# Patient Record
Sex: FEMALE | Race: WHITE | Employment: FULL TIME | URBAN - METROPOLITAN AREA
[De-identification: names, ages, dates, MRNs, and addresses within clinical notes are randomized per-mention and may not be internally consistent; named-entity substitution may affect disease eponyms.]

---

## 2021-04-19 ENCOUNTER — NURSE TRIAGE (OUTPATIENT)
Dept: FAMILY MEDICINE CLINIC | Facility: CLINIC | Age: 36
End: 2021-04-19

## 2021-04-19 NOTE — TELEPHONE ENCOUNTER
Action Requested: Summary for Provider     []  Critical Lab, Recommendations Needed  [] Need Additional Advice  []   FYI    []   Need Orders  [] Need Medications Sent to Pharmacy  []  Other     SUMMARY: Patient c/o feeling a little anxious, recently moved

## 2021-05-07 ENCOUNTER — OFFICE VISIT (OUTPATIENT)
Dept: FAMILY MEDICINE CLINIC | Facility: CLINIC | Age: 36
End: 2021-05-07
Payer: COMMERCIAL

## 2021-05-07 VITALS
HEART RATE: 78 BPM | HEIGHT: 65 IN | BODY MASS INDEX: 19.89 KG/M2 | TEMPERATURE: 98 F | WEIGHT: 119.38 LBS | SYSTOLIC BLOOD PRESSURE: 112 MMHG | DIASTOLIC BLOOD PRESSURE: 73 MMHG

## 2021-05-07 DIAGNOSIS — T78.3XXD ANGIOEDEMA, SUBSEQUENT ENCOUNTER: Primary | ICD-10-CM

## 2021-05-07 DIAGNOSIS — F32.A ANXIETY AND DEPRESSION: ICD-10-CM

## 2021-05-07 DIAGNOSIS — F41.9 ANXIETY AND DEPRESSION: ICD-10-CM

## 2021-05-07 PROCEDURE — 99203 OFFICE O/P NEW LOW 30 MIN: CPT | Performed by: FAMILY MEDICINE

## 2021-05-07 PROCEDURE — 3074F SYST BP LT 130 MM HG: CPT | Performed by: FAMILY MEDICINE

## 2021-05-07 PROCEDURE — 3008F BODY MASS INDEX DOCD: CPT | Performed by: FAMILY MEDICINE

## 2021-05-07 PROCEDURE — 3078F DIAST BP <80 MM HG: CPT | Performed by: FAMILY MEDICINE

## 2021-05-07 RX ORDER — PREDNISONE 20 MG/1
20 TABLET ORAL DAILY
COMMUNITY

## 2021-05-07 RX ORDER — DIPHENHYDRAMINE HCL 12.5MG/5ML
LIQUID (ML) ORAL 4 TIMES DAILY PRN
COMMUNITY

## 2021-05-10 NOTE — PROGRESS NOTES
HPI:    Luke Adler is a 39year old female presents to clinic as a new patient to establish care. Patient is following up on her recent ER visit. On 5/6, patient was seen in the ER with hives on her face and lip swelling.   Thinks reaction could hav encounter diagnosis)  Plan:   -Will continue prednisone as directed. Okay to take Benadryl as needed itching, discomfort.     (F41.9,  F32.9) Anxiety and depression  Plan:   -Discouraged against restarting Zoloft. Can try Lexapro or Celexa.   After patien

## 2021-06-10 ENCOUNTER — PATIENT MESSAGE (OUTPATIENT)
Dept: FAMILY MEDICINE CLINIC | Facility: CLINIC | Age: 36
End: 2021-06-10

## 2021-06-10 ENCOUNTER — TELEPHONE (OUTPATIENT)
Dept: FAMILY MEDICINE CLINIC | Facility: CLINIC | Age: 36
End: 2021-06-10

## 2021-06-10 PROBLEM — F41.8 ANXIETY WITH DEPRESSION: Status: ACTIVE | Noted: 2021-06-10

## 2021-06-10 RX ORDER — BUPROPION HYDROCHLORIDE 75 MG/1
TABLET ORAL
Qty: 60 TABLET | Refills: 0 | Status: SHIPPED | OUTPATIENT
Start: 2021-06-10 | End: 2021-07-17

## 2021-06-10 NOTE — PROGRESS NOTES
HPI   Video Visit  Pt presents for up on depression. 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   Felt as used    Substance and Sexual Activity      Alcohol use: Yes      Drug use: Never      Sexual activity: Not on file    Other Topics      Concerns:        Not on file    Social History Narrative      Not on file    Social Determinants of Health  Financial Re Behavior: Behavior normal.         Thought Content:  Thought content normal.         Cognition and Memory: Cognition and memory normal.         Judgment: Judgment normal.         Assessment and Plan:  Problem List Items Addressed This Visit        Mental H

## 2021-06-10 NOTE — TELEPHONE ENCOUNTER
From: Aristeo Pretty  To: EPI Hodges  Sent: 6/10/2021 1:54 PM CDT  Subject: Visit Follow-up Question    Hi Pastor Holcomb,  It was really nice meeting you today. Thank you for your caring insight.    I've been thinking some more and wondering if we coul

## 2021-06-10 NOTE — TELEPHONE ENCOUNTER
Zoraida Smith states that patient is calling due to anxiety issue and the medication is not working,call dropped. Called patient  and left a complete message to call back, office number and hour provided. [employer] message sent. RN=please triage.

## 2021-06-10 NOTE — TELEPHONE ENCOUNTER
The patient stated needs a follow up visit from 5/7/21. She stated the medication prescribed is not working. She has been on the medication for 1 month. She stated is feeling no different. She is requesting a video appointment with anyone today.   Torres Tran

## 2021-06-10 NOTE — ASSESSMENT & PLAN NOTE
Increase lexapro 10 mg daily  Encourage closer follow up with therapist  Discussed switching to another medication but pt can try increasing to see if symptoms improve  Follow up in 2 weeks  Please call if symptoms worsen.

## 2021-11-06 ENCOUNTER — HOSPITAL ENCOUNTER (OUTPATIENT)
Age: 36
Discharge: HOME | End: 2021-11-06
Payer: COMMERCIAL

## 2021-11-06 VITALS
SYSTOLIC BLOOD PRESSURE: 106 MMHG | DIASTOLIC BLOOD PRESSURE: 67 MMHG | RESPIRATION RATE: 18 BRPM | TEMPERATURE: 99 F | OXYGEN SATURATION: 100 % | HEART RATE: 84 BPM

## 2021-11-06 DIAGNOSIS — J02.9 SORE THROAT: Primary | ICD-10-CM

## 2021-11-06 DIAGNOSIS — R14.0 BLOATING SYMPTOM: ICD-10-CM

## 2021-11-06 DIAGNOSIS — Z20.822 ENCOUNTER FOR LABORATORY TESTING FOR COVID-19 VIRUS: ICD-10-CM

## 2021-11-06 PROCEDURE — 99213 OFFICE O/P EST LOW 20 MIN: CPT | Performed by: EMERGENCY MEDICINE

## 2021-11-06 PROCEDURE — U0002 COVID-19 LAB TEST NON-CDC: HCPCS | Performed by: EMERGENCY MEDICINE

## 2021-11-06 PROCEDURE — 87880 STREP A ASSAY W/OPTIC: CPT | Performed by: EMERGENCY MEDICINE

## 2021-11-06 RX ORDER — IBUPROFEN 600 MG/1
600 TABLET ORAL ONCE
Status: COMPLETED | OUTPATIENT
Start: 2021-11-06 | End: 2021-11-06

## 2021-11-06 NOTE — ED PROVIDER NOTES
Patient Seen in: [facility]      History   Patient presents with:  Covid-19 Test: Congestion, sore throat, headache. Also have a bad stomach ache.  - Entered by patient    Stated Complaint: Covid-19 Test - Congestion, sore throat, headache, b (mild) present. No rhinorrhea. Mouth/Throat:      Mouth: Mucous membranes are moist.      Pharynx: Oropharynx is clear. No posterior oropharyngeal erythema.    Eyes:      Conjunctiva/sclera: Conjunctivae normal.      Pupils: Pupils are equal, round, an over-the-counter medications you are taking do not have duplicate ingredients I.e daytime cold medications such as Dayquil and Theraflu have acetaminophen (Tylenol). Ibuprofen is the same medication as Motrin, and Advil.     Aleve, ibuprofen, and aspirin

## 2021-11-06 NOTE — ED INITIAL ASSESSMENT (HPI)
Pt states woke up with a tickle in her throat yesterday pt states as day progressed began having headache, congestion, sore throat. Pt denies fevers. Pt states having the chills as well.      Pt states Thursday morning getting an IUd placed and has been hav

## 2022-01-26 ENCOUNTER — HOSPITAL ENCOUNTER (OUTPATIENT)
Age: 37
Discharge: HOME | End: 2022-01-26
Payer: COMMERCIAL

## 2022-01-26 VITALS
TEMPERATURE: 99 F | HEIGHT: 65 IN | WEIGHT: 120 LBS | RESPIRATION RATE: 16 BRPM | SYSTOLIC BLOOD PRESSURE: 119 MMHG | OXYGEN SATURATION: 100 % | HEART RATE: 76 BPM | BODY MASS INDEX: 19.99 KG/M2 | DIASTOLIC BLOOD PRESSURE: 73 MMHG

## 2022-01-26 DIAGNOSIS — J02.9 VIRAL PHARYNGITIS: Primary | ICD-10-CM

## 2022-01-26 LAB — S PYO AG THROAT QL: NEGATIVE

## 2022-01-26 PROCEDURE — 99213 OFFICE O/P EST LOW 20 MIN: CPT | Performed by: NURSE PRACTITIONER

## 2022-01-26 PROCEDURE — 87880 STREP A ASSAY W/OPTIC: CPT | Performed by: NURSE PRACTITIONER

## 2022-01-26 NOTE — ED PROVIDER NOTES
Patient Seen in: [facility]    History   CC: sore throat  HPI: Leodan Qiu 39year old female  who presents c/o sore throat, runny nose, congestion, intermittent headaches, cough x4 days.  States s/s improving other than sore throat promptin not injected, no discharge noted, no periorbital edema  ENT - EAC bilaterally without discharge, TM pearly grey with COL visualized appropriately bilaterally. no nasal drainage noted in nares bilat, no cobblestoning to post. Pharynx.    Oropharynx clear,

## 2022-01-26 NOTE — ED INITIAL ASSESSMENT (HPI)
Pt c/o sore throat, congestion, post nasal drip, cough x4 days. No fever. States had covid last month.

## 2022-01-28 ENCOUNTER — TELEMEDICINE (OUTPATIENT)
Dept: FAMILY MEDICINE CLINIC | Facility: CLINIC | Age: 37
End: 2022-01-28

## 2022-01-28 DIAGNOSIS — J06.9 VIRAL UPPER RESPIRATORY TRACT INFECTION: Primary | ICD-10-CM

## 2022-01-28 PROCEDURE — 99213 OFFICE O/P EST LOW 20 MIN: CPT | Performed by: NURSE PRACTITIONER

## 2022-01-28 NOTE — PROGRESS NOTES
HPI  Video Visit  Pt here for follow up tickle in throat last week. No has sore throat, cough, congestion. Symptoms worse in am and pm.   Had covid last month. 12/28-was mildly ill-headache and fatigue.      Is taking muccinex ; went to UC 2 days ago-strep file  Transportation Needs: Not on file  Physical Activity: Not on file  Stress: Not on file  Social Connections: Not on file  Intimate Partner Violence: Not on file  Housing Stability: Not on file    Current Outpatient Medications   Medication Sig Dispens

## 2022-01-28 NOTE — ASSESSMENT & PLAN NOTE
May be post covid related  Supportive care discussed to help alleviate symptoms  Add sudafed from pharmacist, ibuprofen  neti pot  Please call if symptoms worsen or are not resolving.

## 2022-12-19 ENCOUNTER — NURSE ONLY (OUTPATIENT)
Dept: INTERNAL MEDICINE CLINIC | Facility: HOSPITAL | Age: 37
End: 2022-12-19
Attending: EMERGENCY MEDICINE

## 2022-12-19 DIAGNOSIS — Z00.00 WELLNESS EXAMINATION: Primary | ICD-10-CM

## 2022-12-19 PROCEDURE — 86480 TB TEST CELL IMMUN MEASURE: CPT

## 2022-12-20 LAB
M TB IFN-G CD4+ T-CELLS BLD-ACNC: 0.02 IU/ML
M TB TUBERC IFN-G BLD QL: NEGATIVE
M TB TUBERC IGNF/MITOGEN IGNF CONTROL: >10 IU/ML
QFT TB1 AG MINUS NIL: 0 IU/ML
QFT TB2 AG MINUS NIL: 0 IU/ML

## 2024-03-21 PROBLEM — J06.9 VIRAL UPPER RESPIRATORY TRACT INFECTION: Status: RESOLVED | Noted: 2022-01-28 | Resolved: 2024-03-21

## 2024-09-30 ENCOUNTER — OFFICE VISIT (OUTPATIENT)
Dept: FAMILY MEDICINE CLINIC | Facility: CLINIC | Age: 39
End: 2024-09-30
Payer: COMMERCIAL

## 2024-09-30 ENCOUNTER — LAB ENCOUNTER (OUTPATIENT)
Dept: LAB | Age: 39
End: 2024-09-30
Attending: FAMILY MEDICINE
Payer: COMMERCIAL

## 2024-09-30 VITALS
HEIGHT: 65 IN | BODY MASS INDEX: 20.99 KG/M2 | WEIGHT: 126 LBS | RESPIRATION RATE: 17 BRPM | DIASTOLIC BLOOD PRESSURE: 70 MMHG | HEART RATE: 76 BPM | SYSTOLIC BLOOD PRESSURE: 110 MMHG | OXYGEN SATURATION: 98 %

## 2024-09-30 DIAGNOSIS — L28.0 LICHEN SIMPLEX: ICD-10-CM

## 2024-09-30 DIAGNOSIS — Z00.00 ANNUAL PHYSICAL EXAM: Primary | ICD-10-CM

## 2024-09-30 DIAGNOSIS — Z00.00 ANNUAL PHYSICAL EXAM: ICD-10-CM

## 2024-09-30 LAB
ALBUMIN SERPL-MCNC: 4.4 G/DL (ref 3.2–4.8)
ALBUMIN/GLOB SERPL: 1.7 {RATIO} (ref 1–2)
ALP LIVER SERPL-CCNC: 44 U/L
ALT SERPL-CCNC: 14 U/L
ANION GAP SERPL CALC-SCNC: 9 MMOL/L (ref 0–18)
AST SERPL-CCNC: 19 U/L (ref ?–34)
BILIRUB SERPL-MCNC: 0.7 MG/DL (ref 0.3–1.2)
BUN BLD-MCNC: 16 MG/DL (ref 9–23)
BUN/CREAT SERPL: 21.9 (ref 10–20)
CALCIUM BLD-MCNC: 9.6 MG/DL (ref 8.7–10.4)
CHLORIDE SERPL-SCNC: 106 MMOL/L (ref 98–112)
CHOLEST SERPL-MCNC: 203 MG/DL (ref ?–200)
CO2 SERPL-SCNC: 24 MMOL/L (ref 21–32)
CREAT BLD-MCNC: 0.73 MG/DL
DEPRECATED RDW RBC AUTO: 38.6 FL (ref 35.1–46.3)
EGFRCR SERPLBLD CKD-EPI 2021: 107 ML/MIN/1.73M2 (ref 60–?)
ERYTHROCYTE [DISTWIDTH] IN BLOOD BY AUTOMATED COUNT: 12.7 % (ref 11–15)
EST. AVERAGE GLUCOSE BLD GHB EST-MCNC: 120 MG/DL (ref 68–126)
FASTING PATIENT LIPID ANSWER: NO
FASTING STATUS PATIENT QL REPORTED: NO
GLOBULIN PLAS-MCNC: 2.6 G/DL (ref 2–3.5)
GLUCOSE BLD-MCNC: 76 MG/DL (ref 70–99)
HBA1C MFR BLD: 5.8 % (ref ?–5.7)
HCT VFR BLD AUTO: 37.7 %
HDLC SERPL-MCNC: 77 MG/DL (ref 40–59)
HGB BLD-MCNC: 12.5 G/DL
LDLC SERPL CALC-MCNC: 105 MG/DL (ref ?–100)
MCH RBC QN AUTO: 27.4 PG (ref 26–34)
MCHC RBC AUTO-ENTMCNC: 33.2 G/DL (ref 31–37)
MCV RBC AUTO: 82.7 FL
NONHDLC SERPL-MCNC: 126 MG/DL (ref ?–130)
OSMOLALITY SERPL CALC.SUM OF ELEC: 288 MOSM/KG (ref 275–295)
PLATELET # BLD AUTO: 256 10(3)UL (ref 150–450)
POTASSIUM SERPL-SCNC: 4 MMOL/L (ref 3.5–5.1)
PROT SERPL-MCNC: 7 G/DL (ref 5.7–8.2)
RBC # BLD AUTO: 4.56 X10(6)UL
SODIUM SERPL-SCNC: 139 MMOL/L (ref 136–145)
TRIGL SERPL-MCNC: 122 MG/DL (ref 30–149)
TSI SER-ACNC: 0.71 MIU/ML (ref 0.55–4.78)
VLDLC SERPL CALC-MCNC: 21 MG/DL (ref 0–30)
WBC # BLD AUTO: 5.2 X10(3) UL (ref 4–11)

## 2024-09-30 PROCEDURE — 84443 ASSAY THYROID STIM HORMONE: CPT

## 2024-09-30 PROCEDURE — 85027 COMPLETE CBC AUTOMATED: CPT

## 2024-09-30 PROCEDURE — 83036 HEMOGLOBIN GLYCOSYLATED A1C: CPT

## 2024-09-30 PROCEDURE — 80053 COMPREHEN METABOLIC PANEL: CPT

## 2024-09-30 PROCEDURE — 80061 LIPID PANEL: CPT

## 2024-09-30 PROCEDURE — 36415 COLL VENOUS BLD VENIPUNCTURE: CPT

## 2024-09-30 RX ORDER — VORTIOXETINE 5 MG/1
5 TABLET, FILM COATED ORAL EVERY MORNING
COMMUNITY
Start: 2024-09-23

## 2024-09-30 RX ORDER — CLOBETASOL PROPIONATE 0.5 MG/G
1 OINTMENT TOPICAL 2 TIMES DAILY
Qty: 45 G | Refills: 0 | Status: SHIPPED | OUTPATIENT
Start: 2024-09-30

## 2024-09-30 NOTE — H&P
HPI:    Maureen Adair is a 39 year old female presents to clinic for annual physical exam.  Overall, doing well.  No acute concerns.  Diagnosed with lichen simplex, typically affects her genital/rectal area.  Was previously prescribed triamcinolone, which was not effective.  Requesting alternate medication.  Normal appetite, balanced diet.  She exercises regularly.  Normal bowel movements and urination.  Normal sleep habits.  Patient's last menstrual period was 09/05/2024 (exact date).  Regular cycles, no concerns.  .  Works as a nurse practitioner for women's health.  Has 2 children.    HISTORY:  Past Medical History:    Anxiety    Depression    Gestational diabetes (HCC)      History reviewed. No pertinent surgical history.   Family History   Problem Relation Age of Onset    Hypertension Father     Hyperlipidemia Father     Thyroid Disorder Father     Cancer Mother       Social History:   Social History     Socioeconomic History    Marital status:    Tobacco Use    Smoking status: Never    Smokeless tobacco: Never   Vaping Use    Vaping status: Never Used   Substance and Sexual Activity    Alcohol use: Yes     Comment: occ    Drug use: Never     Social Determinants of Health     Financial Resource Strain: Not on File (7/14/2023)    Received from [facility]    Financial Resource Strain     Financial Resource Strain: 0   Food Insecurity: Not on File (9/26/2024)    Received from [facility]    Food Insecurity     Food: 0   Transportation Needs: Not on File (7/14/2023)    Received from [facility]    Transportation Needs     Transportation: 0   Physical Activity: Not on File (7/14/2023)    Received from [facility]    Physical Activity     Physical Activity: 0   Stress: Not on File (7/14/2023)    Received from [facility]    Stress     Stress: 0   Social Connections: Not on File (9/27/2024)    Received from [employer] [facility]    Social Connections     Connectedness: 0   Housing Stability: Not on File (7/14/2023)     [facility]    [facility] Stability     Housin        Medications (Active prior to today's visit):  Current Outpatient Medications   Medication Sig Dispense Refill    TRINTELLIX 5 MG Oral Tab Take 1 tablet (5 mg total) by mouth every morning.      clobetasol 0.05 % External Ointment Apply 1 Application topically 2 (two) times daily. 45 g 0       Allergies:  No Known Allergies      Depression Screening (PHQ-2/PHQ-9): Over the LAST 2 WEEKS   Little interest or pleasure in doing things: Not at all    Feeling down, depressed, or hopeless: Not at all    PHQ-2 SCORE: 0           ROS:   Review of Systems   All other systems reviewed and are negative.      PHYSICAL EXAM:     Vitals:    24 0900   BP: 110/70   Pulse: 76   Resp: 17   SpO2: 98%   Weight: 126 lb (57.2 kg)   Height: 5' 5\" (1.651 m)     Physical Exam  Vitals reviewed.   Constitutional:       General: She is not in acute distress.  HENT:      Head: Normocephalic and atraumatic.      Right Ear: Tympanic membrane, ear canal and external ear normal.      Left Ear: Tympanic membrane, ear canal and external ear normal.      Nose: Nose normal.      Mouth/Throat:      Pharynx: Uvula midline.   Eyes:      Conjunctiva/sclera: Conjunctivae normal.      Pupils: Pupils are equal, round, and reactive to light.   Neck:      Thyroid: No thyromegaly.   Cardiovascular:      Rate and Rhythm: Normal rate and regular rhythm.      Heart sounds: Normal heart sounds. No murmur heard.  Pulmonary:      Effort: Pulmonary effort is normal. No respiratory distress.      Breath sounds: Normal breath sounds. No wheezing or rales.   Chest:   Breasts:     Right: Normal. No swelling, bleeding, inverted nipple, mass, nipple discharge, skin change or tenderness.      Left: Normal. No swelling, bleeding, inverted nipple, mass, nipple discharge, skin change or tenderness.   Abdominal:      General: Bowel sounds are normal. There is no distension.      Palpations: Abdomen is soft.       Tenderness: There is no abdominal tenderness. There is no guarding or rebound.   Musculoskeletal:      Cervical back: Normal range of motion and neck supple.   Lymphadenopathy:      Cervical: No cervical adenopathy.   Neurological:      Mental Status: She is alert.         ASSESSMENT/PLAN:   (Z00.00) Annual physical exam  (primary encounter diagnosis)  Plan: Lipid Panel [E], Comp Metabolic Panel (14) [E],        TSH W Reflex To Free T4 [E], CBC, Platelet, No         Differential [E], Hemoglobin A1C [E]  - Immunizations UTD, flu vaccine given   - Reinforced healthy diet, lifestyle, and exercise.  - Past Medical/Social/Family histories reviewed  - Regular dental visits recommended   - Regular eye exams recommended     Health Maintenance   Topic Date Due    Pap Smear  09/07/2028       Follow up in 1 year or sooner if needed      (L28.0) Lichen simplex  Plan: Derm Referral - In Network  - Erythema noted over labia and rectum, Clobetasol to pharmacy. Derm referral placed if symptoms don't improve.          Responsible party/patient verbalized understanding of information discussed. No barriers to learning observed.            Orders This Visit:  Orders Placed This Encounter   Procedures    Lipid Panel [E]    Comp Metabolic Panel (14) [E]    TSH W Reflex To Free T4 [E]    CBC, Platelet, No Differential [E]    Hemoglobin A1C [E]    Fluzone trivalent vaccine, PF 0.5mL, 6mo+ (83027)       Meds This Visit:  Requested Prescriptions     Signed Prescriptions Disp Refills    clobetasol 0.05 % External Ointment 45 g 0     Sig: Apply 1 Application topically 2 (two) times daily.       Imaging & Referrals:  INFLUENZA VACCINE, TRI, PRESERV FREE, 0.5 ML  DERM - INTERNAL     Chaperone offered at visit today.     The 21st Century cures Act makes medical notes like these available to patients in the interest of transparency.  However, be advised that this is a medical document.  It is intended as peer to peer communication.  It is  written in medical language and may contain abbreviations or verbiage that are unfamiliar.  It may appear blunt or direct.  Medical documents are intended to carry relevant information, facts as evident, and the clinical opinion of the practitioner.      This note was created by Dragon voice recognition. Errors in content may be related to improper recognition by the system; efforts to review and correct have been done but errors may still exist. Please contact me with any questions.       9/30/2024  David Cates MD

## 2024-11-12 ENCOUNTER — OFFICE VISIT (OUTPATIENT)
Dept: FAMILY MEDICINE CLINIC | Facility: CLINIC | Age: 39
End: 2024-11-12

## 2024-11-12 VITALS
BODY MASS INDEX: 21 KG/M2 | HEART RATE: 73 BPM | DIASTOLIC BLOOD PRESSURE: 64 MMHG | RESPIRATION RATE: 16 BRPM | WEIGHT: 126 LBS | SYSTOLIC BLOOD PRESSURE: 102 MMHG | TEMPERATURE: 98 F

## 2024-11-12 DIAGNOSIS — L42 PITYRIASIS ROSEA: Primary | ICD-10-CM

## 2024-11-12 PROCEDURE — 3074F SYST BP LT 130 MM HG: CPT | Performed by: FAMILY MEDICINE

## 2024-11-12 PROCEDURE — 3078F DIAST BP <80 MM HG: CPT | Performed by: FAMILY MEDICINE

## 2024-11-12 PROCEDURE — 99213 OFFICE O/P EST LOW 20 MIN: CPT | Performed by: FAMILY MEDICINE

## 2024-11-12 NOTE — PROGRESS NOTES
HPI: Maureen is a 39 year old female who presents for rash. Started noticing a few bumps on legs and since then, they have spread to arms, chest, back. No new exposures. Mildly uncomfortable.     PMH:    Past Medical History:    Anxiety    Depression    Gestational diabetes (HCC)      Alg:  Patient has no known allergies.   Meds: Medications Ordered Prior to Encounter[1]   Tobacco Use: no    Objective:   Gen: AOx3. NAD.  /64   Pulse 73   Temp 98 °F (36.7 °C) (Temporal)   Resp 16   Wt 126 lb (57.2 kg)   LMP 10/30/2024 (Exact Date)   BMI 20.97 kg/m²   Skin: Erythematous, raised, dry patches scattered to bilateral legs, arms, chest and back    Assessment:/Plan:  Encounter Diagnosis   Name Primary?    Pityriasis rosea Yes       Discussed diagnosis and benign nature.  Information given.  May use Benadryl or oTC cream if uncomfortable. Schedule with Dermatology if worsens.    Colleen Weiler,            [1]   Current Outpatient Medications on File Prior to Visit   Medication Sig Dispense Refill    clobetasol 0.05 % External Ointment Apply 1 Application topically 2 (two) times daily. 45 g 0     No current facility-administered medications on file prior to visit.

## 2024-12-02 ENCOUNTER — TELEPHONE (OUTPATIENT)
Dept: FAMILY MEDICINE CLINIC | Facility: CLINIC | Age: 39
End: 2024-12-02

## 2024-12-02 ENCOUNTER — OFFICE VISIT (OUTPATIENT)
Dept: FAMILY MEDICINE CLINIC | Facility: CLINIC | Age: 39
End: 2024-12-02

## 2024-12-02 VITALS
WEIGHT: 127 LBS | OXYGEN SATURATION: 95 % | DIASTOLIC BLOOD PRESSURE: 62 MMHG | HEIGHT: 65 IN | BODY MASS INDEX: 21.16 KG/M2 | SYSTOLIC BLOOD PRESSURE: 94 MMHG | TEMPERATURE: 98 F | HEART RATE: 72 BPM

## 2024-12-02 DIAGNOSIS — J06.9 ACUTE UPPER RESPIRATORY INFECTION: Primary | ICD-10-CM

## 2024-12-02 DIAGNOSIS — J02.9 SORE THROAT: ICD-10-CM

## 2024-12-02 LAB
CONTROL LINE PRESENT WITH A CLEAR BACKGROUND (YES/NO): YES YES/NO
KIT LOT #: NORMAL NUMERIC

## 2024-12-02 PROCEDURE — 99213 OFFICE O/P EST LOW 20 MIN: CPT

## 2024-12-02 PROCEDURE — 3078F DIAST BP <80 MM HG: CPT

## 2024-12-02 PROCEDURE — 87880 STREP A ASSAY W/OPTIC: CPT

## 2024-12-02 PROCEDURE — 3074F SYST BP LT 130 MM HG: CPT

## 2024-12-02 PROCEDURE — 3008F BODY MASS INDEX DOCD: CPT

## 2024-12-02 RX ORDER — PREDNISONE 20 MG/1
20 TABLET ORAL 2 TIMES DAILY
Qty: 10 TABLET | Refills: 0 | Status: SHIPPED | OUTPATIENT
Start: 2024-12-02 | End: 2024-12-07

## 2024-12-02 RX ORDER — LEVOCETIRIZINE DIHYDROCHLORIDE 5 MG/1
5 TABLET, FILM COATED ORAL EVERY EVENING
Qty: 30 TABLET | Refills: 0 | Status: SHIPPED | OUTPATIENT
Start: 2024-12-02 | End: 2025-01-01

## 2024-12-02 NOTE — TELEPHONE ENCOUNTER
levocetirizine 5 MG Oral Tab, Take 1 tablet (5 mg total) by mouth every evening., Disp: 30 tablet, Rfl: 0

## 2024-12-02 NOTE — PROGRESS NOTES
Subjective:   Maureen Adair is a 39 year old female who presents for Sore Throat (Pt states she has been having a sore throat for 2 weeks and pt states she has been taking antibiotics for 2 days for ear infection on left side and pt states it also hurts to swallow. ).     Patient c/o upper respiratory symptoms for 2  weeks. Patient reports sore throat, congestion, yellow/red colored nasal discharge, ear pain, OTC cold meds have not been helping, prior history of bronchitis, denies fever, denies cough, denies sinus pain.  Patient states that her left side of her throat hurts. She does have pain with swallowing and has to clear her throat frequently.  Her left ear hurts.   Patient was taking Augmentin for the past 48 hours and pain is not getting better.          History/Other:      Chief Complaint Reviewed and Verified  Nursing Notes Reviewed and   Verified  Tobacco Reviewed  Allergies Reviewed  Medications Reviewed    Problem List Reviewed  Medical History Reviewed  Surgical History   Reviewed  OB Status Reviewed  Family History Reviewed  Social History   Reviewed           Tobacco:  She has never smoked tobacco.      Current Outpatient Medications   Medication Sig Dispense Refill    amoxicillin clavulanate 875-125 MG Oral Tab Take 1 tablet by mouth 2 (two) times daily.      predniSONE 20 MG Oral Tab Take 1 tablet (20 mg total) by mouth 2 (two) times daily for 5 days. 10 tablet 0    levocetirizine 5 MG Oral Tab Take 1 tablet (5 mg total) by mouth every evening. 30 tablet 0    clobetasol 0.05 % External Ointment Apply 1 Application topically 2 (two) times daily. 45 g 0       Allergies[1]      Review of Systems   Constitutional:  Positive for chills. Negative for activity change, fatigue and fever.   HENT:  Positive for congestion, ear pain, sore throat, trouble swallowing and voice change. Negative for rhinorrhea.    Eyes: Negative.  Negative for redness.   Respiratory: Negative.  Negative for cough,  shortness of breath and wheezing.    Cardiovascular: Negative.  Negative for chest pain.   Gastrointestinal: Negative.  Negative for abdominal pain, constipation, diarrhea, nausea and vomiting.   Endocrine: Negative.    Genitourinary: Negative.  Negative for difficulty urinating and frequency.   Musculoskeletal: Negative.  Negative for back pain, joint swelling and myalgias.   Skin: Negative.  Negative for rash.   Allergic/Immunologic: Negative.    Neurological: Negative.  Negative for dizziness, syncope, light-headedness and headaches.   Hematological: Negative.    Psychiatric/Behavioral: Negative.           Objective:   BP 94/62 (BP Location: Right arm, Patient Position: Sitting, Cuff Size: adult)   Pulse 72   Temp 98 °F (36.7 °C) (Oral)   Ht 5' 5\" (1.651 m)   Wt 127 lb (57.6 kg)   LMP 11/28/2024 (Exact Date)   SpO2 95%   BMI 21.13 kg/m²  Estimated body mass index is 21.13 kg/m² as calculated from the following:    Height as of this encounter: 5' 5\" (1.651 m).    Weight as of this encounter: 127 lb (57.6 kg).      Physical Exam  Vitals and nursing note reviewed.   Constitutional:       Appearance: Normal appearance. She is normal weight.   HENT:      Head: Normocephalic and atraumatic.      Right Ear: There is impacted cerumen.      Left Ear: Tympanic membrane is erythematous.      Nose: Nose normal.      Mouth/Throat:      Mouth: Mucous membranes are moist.      Pharynx: Posterior oropharyngeal erythema present.   Eyes:      Extraocular Movements: Extraocular movements intact.      Conjunctiva/sclera: Conjunctivae normal.      Pupils: Pupils are equal, round, and reactive to light.   Cardiovascular:      Rate and Rhythm: Normal rate and regular rhythm.      Pulses: Normal pulses.      Heart sounds: Normal heart sounds.   Pulmonary:      Effort: Pulmonary effort is normal.      Breath sounds: Normal breath sounds.   Abdominal:      General: Abdomen is flat. Bowel sounds are normal.      Palpations: Abdomen  is soft.   Musculoskeletal:         General: Normal range of motion.      Cervical back: Normal range of motion and neck supple.   Skin:     General: Skin is warm and dry.   Neurological:      General: No focal deficit present.      Mental Status: She is alert and oriented to person, place, and time. Mental status is at baseline.   Psychiatric:         Mood and Affect: Mood normal.         Behavior: Behavior normal.         Thought Content: Thought content normal.         Judgment: Judgment normal.           Assessment & Plan:     1. Acute upper respiratory infection  - predniSONE 20 MG Oral Tab; Take 1 tablet (20 mg total) by mouth 2 (two) times daily for 5 days.  Dispense: 10 tablet; Refill: 0  - levocetirizine 5 MG Oral Tab; Take 1 tablet (5 mg total) by mouth every evening.  Dispense: 30 tablet; Refill: 0  -Advised to take medication as directed  -Symptomatic treatment including salt water gargling, plenty of fluids, rest, humidified air  -Other pharmacological symptomatic treatment including NyQuil and chloraseptic sprays for pain control.  -Explained to patient that symptoms may become worse in the next day before improving.     2. Sore throat  - Strep A Assay W/Optic         Medication use, effects and side effects discussed in detail with patient. The patient indicated understanding of the diagnosis and agreed with the plan of care.    Return if symptoms worsen or fail to improve.    EPI Arias         [1] No Known Allergies

## 2024-12-09 ENCOUNTER — PATIENT MESSAGE (OUTPATIENT)
Dept: FAMILY MEDICINE CLINIC | Facility: CLINIC | Age: 39
End: 2024-12-09

## 2024-12-09 NOTE — TELEPHONE ENCOUNTER
Letter pended.  Please sign & return to triage    Name Date Status Dose VIS Date Route Site  Lot# Given By Verified By   Influenza Vaccine, trivalent (IIV3), PF 0.5mL ([id number]) 9/30/2024 Given 0.5 mL 08/06/2021 INTRAMUSCULA LEFT ARM [employer]OFI [employer]STEUR [id number]

## 2025-06-13 ENCOUNTER — PATIENT MESSAGE (OUTPATIENT)
Dept: FAMILY MEDICINE CLINIC | Facility: CLINIC | Age: 40
End: 2025-06-13

## 2025-06-13 DIAGNOSIS — F41.8 ANXIETY WITH DEPRESSION: ICD-10-CM

## 2025-06-13 RX ORDER — SERTRALINE HYDROCHLORIDE 25 MG/1
25 TABLET, FILM COATED ORAL DAILY
Qty: 30 TABLET | Refills: 0 | Status: SHIPPED | OUTPATIENT
Start: 2025-06-13 | End: 2025-07-13

## 2025-06-13 NOTE — TELEPHONE ENCOUNTER
Patient calling ( name and date of birth of patient verified ) checking on status of   Sertraline    Informed the patient RX has yael sent to [place] pharmacy , she will call the pharmacy

## 2025-06-13 NOTE — TELEPHONE ENCOUNTER
[employer] message sent to pt.     No future appointments.    Requested Prescriptions     Pending Prescriptions Disp Refills    sertraline (ZOLOFT) 25 MG Oral Tab 30 tablet 0     Sig: Take 1 tablet (25 mg total) by mouth daily.       Last Office Visit with PCP: 4/15/2025

## 2025-07-29 ENCOUNTER — NURSE TRIAGE (OUTPATIENT)
Dept: FAMILY MEDICINE CLINIC | Facility: CLINIC | Age: 40
End: 2025-07-29

## 2025-08-08 ENCOUNTER — OFFICE VISIT (OUTPATIENT)
Dept: FAMILY MEDICINE CLINIC | Facility: CLINIC | Age: 40
End: 2025-08-08

## 2025-08-08 VITALS
WEIGHT: 125 LBS | HEIGHT: 65 IN | DIASTOLIC BLOOD PRESSURE: 71 MMHG | OXYGEN SATURATION: 99 % | SYSTOLIC BLOOD PRESSURE: 105 MMHG | HEART RATE: 65 BPM | BODY MASS INDEX: 20.83 KG/M2

## 2025-08-08 DIAGNOSIS — R68.2 DRY MOUTH: ICD-10-CM

## 2025-08-08 DIAGNOSIS — R63.1 POLYDIPSIA: Primary | ICD-10-CM

## 2025-08-08 DIAGNOSIS — K59.00 CONSTIPATION, UNSPECIFIED CONSTIPATION TYPE: ICD-10-CM

## 2025-08-08 DIAGNOSIS — M95.0 NASAL DEFORMITY: ICD-10-CM

## 2025-08-08 LAB
ALBUMIN SERPL-MCNC: 5 G/DL (ref 3.2–4.8)
ALBUMIN/GLOB SERPL: 2 (ref 1–2)
ALP LIVER SERPL-CCNC: 37 U/L (ref 37–98)
ALT SERPL-CCNC: 15 U/L (ref 10–49)
ANION GAP SERPL CALC-SCNC: 6 MMOL/L (ref 0–18)
AST SERPL-CCNC: 19 U/L (ref ?–34)
BASOPHILS # BLD AUTO: 0.04 X10(3) UL (ref 0–0.2)
BASOPHILS NFR BLD AUTO: 0.8 %
BILIRUB SERPL-MCNC: 0.6 MG/DL (ref 0.3–1.2)
BILIRUB UR QL: NEGATIVE
BUN BLD-MCNC: 14 MG/DL (ref 9–23)
BUN/CREAT SERPL: 16.1 (ref 10–20)
CALCIUM BLD-MCNC: 10 MG/DL (ref 8.7–10.4)
CHLORIDE SERPL-SCNC: 100 MMOL/L (ref 98–112)
CHOLEST SERPL-MCNC: 222 MG/DL (ref ?–200)
CLARITY UR: CLEAR
CO2 SERPL-SCNC: 29 MMOL/L (ref 21–32)
COLOR UR: COLORLESS
CREAT BLD-MCNC: 0.87 MG/DL (ref 0.55–1.02)
DEPRECATED RDW RBC AUTO: 39.4 FL (ref 35.1–46.3)
EGFRCR SERPLBLD CKD-EPI 2021: 86 ML/MIN/1.73M2 (ref 60–?)
EOSINOPHIL # BLD AUTO: 0.18 X10(3) UL (ref 0–0.7)
EOSINOPHIL NFR BLD AUTO: 3.7 %
ERYTHROCYTE [DISTWIDTH] IN BLOOD BY AUTOMATED COUNT: 12.9 % (ref 11–15)
EST. AVERAGE GLUCOSE BLD GHB EST-MCNC: 117 MG/DL (ref 68–126)
FASTING PATIENT LIPID ANSWER: YES
FASTING STATUS PATIENT QL REPORTED: YES
GLOBULIN PLAS-MCNC: 2.5 G/DL (ref 2–3.5)
GLUCOSE BLD-MCNC: 88 MG/DL (ref 70–99)
GLUCOSE UR-MCNC: NORMAL MG/DL
HBA1C MFR BLD: 5.7 % (ref ?–5.7)
HCT VFR BLD AUTO: 41.8 % (ref 35–48)
HDLC SERPL-MCNC: 106 MG/DL (ref 40–59)
HGB BLD-MCNC: 13.2 G/DL (ref 12–16)
HGB UR QL STRIP.AUTO: NEGATIVE
IMM GRANULOCYTES # BLD AUTO: 0.01 X10(3) UL (ref 0–1)
IMM GRANULOCYTES NFR BLD: 0.2 %
KETONES UR-MCNC: NEGATIVE MG/DL
LDLC SERPL CALC-MCNC: 105 MG/DL (ref ?–100)
LEUKOCYTE ESTERASE UR QL STRIP.AUTO: NEGATIVE
LYMPHOCYTES # BLD AUTO: 2.13 X10(3) UL (ref 1–4)
LYMPHOCYTES NFR BLD AUTO: 44 %
MCH RBC QN AUTO: 26.6 PG (ref 26–34)
MCHC RBC AUTO-ENTMCNC: 31.6 G/DL (ref 31–37)
MCV RBC AUTO: 84.3 FL (ref 80–100)
MONOCYTES # BLD AUTO: 0.52 X10(3) UL (ref 0.1–1)
MONOCYTES NFR BLD AUTO: 10.7 %
NEUTROPHILS # BLD AUTO: 1.96 X10 (3) UL (ref 1.5–7.7)
NEUTROPHILS # BLD AUTO: 1.96 X10(3) UL (ref 1.5–7.7)
NEUTROPHILS NFR BLD AUTO: 40.6 %
NITRITE UR QL STRIP.AUTO: NEGATIVE
NONHDLC SERPL-MCNC: 116 MG/DL (ref ?–130)
OSMOLALITY SERPL CALC.SUM OF ELEC: 280 MOSM/KG (ref 275–295)
PH UR: 7 (ref 5–8)
PLATELET # BLD AUTO: 296 10(3)UL (ref 150–450)
POTASSIUM SERPL-SCNC: 4.2 MMOL/L (ref 3.5–5.1)
PROT SERPL-MCNC: 7.5 G/DL (ref 5.7–8.2)
PROT UR-MCNC: NEGATIVE MG/DL
RBC # BLD AUTO: 4.96 X10(6)UL (ref 3.8–5.3)
SODIUM SERPL-SCNC: 135 MMOL/L (ref 136–145)
SP GR UR STRIP: 1.01 (ref 1–1.03)
TRIGL SERPL-MCNC: 63 MG/DL (ref 30–149)
TSI SER-ACNC: 1.21 UIU/ML (ref 0.55–4.78)
UROBILINOGEN UR STRIP-ACNC: NORMAL
VLDLC SERPL CALC-MCNC: 11 MG/DL (ref 0–30)
WBC # BLD AUTO: 4.8 X10(3) UL (ref 4–11)

## 2025-08-08 PROCEDURE — 3078F DIAST BP <80 MM HG: CPT

## 2025-08-08 PROCEDURE — 99214 OFFICE O/P EST MOD 30 MIN: CPT

## 2025-08-08 PROCEDURE — 3074F SYST BP LT 130 MM HG: CPT

## 2025-08-08 PROCEDURE — 3008F BODY MASS INDEX DOCD: CPT

## 2025-08-08 RX ORDER — ROFLUMILAST 3 MG/G
AEROSOL, FOAM TOPICAL
COMMUNITY

## 2025-08-08 RX ORDER — MAGNESIUM CARB/ALUMINUM HYDROX 105-160MG
296 TABLET,CHEWABLE ORAL ONCE
Qty: 296 ML | Refills: 0 | Status: SHIPPED | OUTPATIENT
Start: 2025-08-08 | End: 2025-08-08

## 2025-08-08 RX ORDER — POLYETHYLENE GLYCOL 3350 17 G/17G
17 POWDER, FOR SOLUTION ORAL DAILY
Qty: 510 G | Refills: 1 | Status: SHIPPED | OUTPATIENT
Start: 2025-08-08

## 2025-08-11 LAB
DSDNA IGG SERPL IA-ACNC: 0.9 IU/ML (ref ?–10)
ENA AB SER QL IA: 0.2 UG/L (ref ?–0.7)
ENA AB SER QL IA: NEGATIVE

## (undated) NOTE — LETTER
Date & Time: 11/6/2021, 12:01 PM  Patient: Randy Quinones  Encounter Provider(s):    EPI Mathis       To Whom It May Concern:    Randy Quinones was seen and treated in our department on 11/6/2021.  She should not return to work until  11/09/2021

## (undated) NOTE — LETTER
12/9/2024        Maureen Adair        [address] [facility] [address]         To Whom It May Concern,    Maureen  received the Influenza vaccine on 9/30/2024    Name Date Status Dose VIS Date Route Site  Lot# Given By Verified By   Influenza Vaccine, trivalent (IIV3), PF 0.5mL ([id number]) 9/30/2024 Given 0.5 mL 08/06/2021 INTRAMUSCULA LEFT ARM [employer]OFI PASTEUR [id number]       Sincerely,      David Cates MD  [address] [place] [id number]  Ph: [phone]  Fax: [phone]        Document electronically generated by:  Lyn GAONA CMA

## (undated) NOTE — LETTER
IMMUNIZATIONS: To be completed by health care provider. The mo/da/yr for every dose administered is required. If a specific vaccine is medically contraindicated, a separate written statement must be attached by the health care provider responsible for completing the health examination explaining the medical reason for the contraindication.     VACCINE/DOSE DATE DATE DATE DATE   Diphtheria, Tetanus and Pertussis (DTP or DTap)       Tdap 1/1/2012 1/3/2017 11/21/2019 12/17/2021   Td 7/30/2003 1/1/2012     Pediatric DT       Inactivate Polio (IPV) 7/24/2012      Oral Polio (OPV)       Haemophilus Influenza Type B (Hib)       Hepatitis B (HB) 7/30/2003 3/1/2011 1/29/2013    Varicella (Chickenpox)       Combined Measles, Mumps and Rubella (MMR) 6/7/1996      Measles (Rubeola)       Rubella (3-day measles)       Mumps       Pneumococcal       Meningococcal Conjugate           VACCINE/DOSE DATE DATE DATE DATE DATE DATE   Hepatitis A 3/1/2011        HPV 10/15/2007 12/20/2007 5/15/2008      Influenza 10/2/2017 9/24/2018 10/14/2020 12/17/2021 9/12/2022 10/26/2023  09/30/2024   Men B         Covid 1/15/2021 3/4/2021 10/24/2021 9/14/2022 10/26/2023

## (undated) NOTE — LETTER
Date & Time: 11/6/2021, 11:47 AM  Patient: Zaheer Nicole  Encounter Provider(s):    EPI Machado       To Whom It May Concern:    Zaheer Nicole was seen and treated in our department on 11/6/2021. She can return to work 11/08/2021. No covid.  ex